# Patient Record
(demographics unavailable — no encounter records)

---

## 2024-11-18 NOTE — HISTORY OF PRESENT ILLNESS
[de-identified] : Subjective: - Summary : Patient presents with a primary complaint of pain in the right knee and hip, which he has been experiencing for about six months and two weeks respectively. The pain increases with activity and limits his mobility significantly, impacting his daily life and activities. - Chief Complaint (CC) : The patient's chief complaints are significant pain in the right knee and hip, limiting his mobility and impacting his daily activities. - History of Present Illness : The patient is a 78/79 year old male who has been regularly active, with a history of doing 10,000 steps a day religiously for 15 years. About six months ago, he started experiencing pain in his right knee which gradually worsened. An x-ray done six months ago confirmed that he has arthritis in his knee. In the past two weeks, he started experiencing intense pain in his hip which affected his walking, causing him to use a cane. The patient has a family history of arthritis from his mother. His pain level often reaches 8 or 9 out of 10, and it becomes severe each time he walks more than 100 feet. The patient has been undergoing physical therapy for the last six months, directed at the knee, which has proved unsatisfactory for him. The patient also went for acupuncture therapy last week, which provided temporary relief only. - Past Medical History : Patient has a history of right knee pain and received a shoulder replacement in the past. He also has a history of leg cramps for which he received physical therapy 10 years ago. He reported that certain exercises successfully manage his leg cramps. - Past Surgical History : The patient underwent a successful shoulder replacement years back. - Family History : - Mother - , diagnosis of terminal hypochondria and arthritis

## 2024-11-18 NOTE — DISCUSSION/SUMMARY
[de-identified] : Assessment and Plan: - Right Hip and Knee ArthritisWith left hip arthritis does not symptomatic : The patient has moderate arthritis in both his hips, with the right hip being of concern here. Additionally, there are signs of arthritis in the knee, particularly on the right side. This is affecting the patient's mobility and quality of life significantly. - Therapeutic Interventions: Initially, conservative management with Tylenol Arthritis once or twice a day. A recommendation also made to apply either ice or heat packs to the affected areas.He is not able to take anti-inflammatories.  He will continue to use his cane which I think is reasonable.  We discussed physical therapy as well and he prefers home exercises but did agree to try therapy and will try to develop a home program as directed more at the hip.  - Patient Education: I have educated the patient about arthritis and given him realistic expectations about its management. Owing to his enduring pain even with physical therapy, I have explained to him the option of replacement surgery.  He is interested in pursuing that to deal with the symptoms but the hip symptoms have been relatively short-lived and I would like to try to treat them to see if we get him to except a baseline before moving to surgical intervention.  We provided prescription for physical therapy and asked him to work on this nail than on operative modalities we discussed.  Will follow-up in 2 to 3 months to gauge his response unless is any significant change at which point he will not hesitate to contact us.  - Follow-Up: Patient is to follow up in 2  months after trying a focused physical therapy for the hip and taking OTC Tylenol Arthritis regularly.

## 2024-11-18 NOTE — CONSULT LETTER
[FreeTextEntry2] : Dr Gamboa [FreeTextEntry1] : I had the privilege of evaluating your patient today. I have enclosed my office note for your reference. If you have any questions, concerns or further input, please do not hesitate to contact me.  Thank you for allowing me to participate in the care of your patient.  Sincerely, Moe Villegas MD

## 2024-11-18 NOTE — PHYSICAL EXAM
[de-identified] : Physical exam he is alert and oriented.  He is ambulating with minimal antalgia.  There is a positive Stinchfield on the right and some pain and stiffness with range of motion compared to the left.  There is also some slight discomfort and crepitus with range of motion of the right knee.  He is neurologically intact in both extremities and feet are warm and well-perfused. [de-identified] : I reviewed previous images of both hips as well as both knees.  There is moderate arthritis of both hips with joint space narrowing sclerosis but no bone-on-bone apposition.  Right knee has more significant arthritis than the left knee particular in the patellofemoral joint where there is significant joint space loss.  The medial compartment is also significantly affected.

## 2024-11-24 NOTE — HISTORY OF PRESENT ILLNESS
[FreeTextEntry8] : Mr. PARAG PRIETO is a 78-year-old male with history of single kidney, prediabetes, HLD, HTN and GERD presenting today for acute visit. He recently saw Dr. Villegas and discussed management of his arthritis.

## 2025-03-24 NOTE — PHYSICAL EXAM
[TextEntry] : PHYSICAL EXAM  General: The patient was alert, oriented and in no distress. Voice was clear.  Face: The patient had no facial asymmetry or mass. The skin was unremarkable.  Ears: External ears were normal without deformity. Ear canals were clear. No cerumen or inflammation Tympanic membranes were intact and normal. No perforation or effusion. mobile  Nose:  The external nose had no significant deformity. . On anterior rhinoscopy, the nasal mucosa was clear.  The anterior septum was grossly midline and deviating to the right. There were no visualized polyps, purulence  or masses.  Clare maneuver was not significantly positive  Oral cavity: Oral mucosa- normal. Oral and base of tongue- clear and without mass. Gingival and buccal mucosa- moist and without lesions. Palate- the palate moved well. There was no cleft palate. There appeared to be good salivary flow.   Oral cavity/oropharynx- no pus, erythema or mass  Neck:  The neck was symmetrical. The parotid and submandibular glands were normal without masses. The trachea was midline and there was no unusual crepitus. Thyroid was smooth and nontender and no masses were palpated. No masses  Lymphatics: Cervical adenopathy- none.     PROCEDURE:  NASAL ENDOSCOPY  Surgeon: Dr. Weeks Indication: Nasal polyps, status post left nasal polypectomy and sinus surgery, assess for infection, inadequate exam on anterior rhinoscopy Anesthetic: Topical lidocaine and Afrin Procedure: The patient was placed in a sitting position.  Following application of the topical anesthetic and decongestant, exam was performed with a flexible telescope.  The scope was passed along the right nasal floor to the nasopharynx.  It was then passed into the region of the middle meatus, middle turbinate, and sphenoethmoid region.  An identical procedure was performed on the left side.  The following findings were noted:  Nasal mucosa: Mild edema.  There is mild dryness anteriorly with little crust Septum:  deviated to the right  Right nasal cavity      Inferior turbinate: Normal      Middle turbinate: normal      Superior turbinate: normal      Inferior meatus: no pus, polyps or congestion      Middle meatus: Postsurgical changes.  The maxillary and ethmoid sinuses were open.  There was no congestion, pus, or polyps.      Superior meatus:  no pus, polyps, or congestion      Sphenoethmoidal recess: no pus, polyps or congestion  Left nasal cavity .      Inferior turbinate: Normal      Middle turbinate: normal      Superior turbinate: normal      Inferior meatus: no pus, polyps or congestion      Middle meatus: Postsurgical changes-the maxillary and ethmoid sinuses were open.  There was polypoid mucosa/small polyp in the anterior ethmoid sinuses/frontal recess.  No purulent drainage      Superior meatus:  no pus, polyps, or congestion      Sphenoethmoidal recess: no pus, polyps or congestion.  Ostium was visualized and was clear  Nasopharynx: no masses, choanae patent, no adenoid tissue

## 2025-03-24 NOTE — ASSESSMENT
[FreeTextEntry1] : He had been doing well since his last visit until January where he developed right-sided nasal congestion intermittently with mild sinus pressure.  There was little crusting on the right side.  The sinuses were clear on that side.  He had a small area of polypoid mucosa/small polyp in the left anterior ethmoid sinuses/frontal recess.  There is no obvious infection on either side.  Plan -Findings and management options were discussed with the patient. - Nasal saline rinses as needed - Moisturizing nasal gel twice daily - Nasal steroid spray as needed - he is going to take a short burst of prednisone.  He has taken it in the past.  I reviewed some of the associated risks including the risk of mood changes, GI complications and bone issues/fractures. They should take the medication with food, take pepcid, avoid drinking alcohol and avoid strenuous exercise. - I will hold off on antibiotics since there was no obvious infection - I will speak with him about repeating his hearing test when he returns - Follow-up in approximately 3 weeks - Call and return earlier if any problems or worsening symptoms

## 2025-03-24 NOTE — HISTORY OF PRESENT ILLNESS
[de-identified] : PARAG PRIETO is a 79 year old patient With a history of chronic rhinosinusitis, nasal polyps, sinus surgery, and nasal valve remodeling.  He had been doing well with significant improvement in the nasal obstruction.  In January while in [place], he started having mild right-sided congestion with a mild pressure sensation superiorly in the nose.  He has no nasal drainage.  He has no sinus pain.  He has been using a nasal steroid spray.     ENT history  He has a history of allergies. He had immunotherapy in the past He has constant sinus symptoms He has reflux and has been on medication  He had endoscopic sinus surgery in 2018 for nasal polyps bilateral inferior turbinate submucous resection and left nasal polypectomy 5/30/23. Pathology was benign and showed benign sinonasal nasal mucosa with mild chronic inflammation.  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 Per the note, there is evidence of prior sinus surgery. There was mild mucosal thickening involving the maxillary sinuses with a small mucous retention cyst versus polyp along the medial wall of the right maxillary sinus, mild mucosal thickening of the ethmoid air cells, polypoid soft tissue thickening at the bilateral sphenoid ethmoidal recesses, the sphenoid sinuses were clear and frontal sinuses were clear

## 2025-03-28 NOTE — HISTORY OF PRESENT ILLNESS
[FreeTextEntry1] : The patient presents for follow up visit in regard to _______.  The patient was last seen on September 4th, 2024, in regard to right index and thumb trigger finger which were treated with a corticosteroid injection into the flexor tendon sheath and FPL tendon sheath.

## 2025-04-07 NOTE — ASSESSMENT
[FreeTextEntry1] : Mr. Lyle is a 79-year-old man with an umbilical ventral hernia and a rectus diastasis.  We discussed the options for management, including watchful waiting, an open ventral hernia repair with mesh, a robotic-assisted ventral hernia repair with mesh, and an open ventral hernia repair in conjunction with plastic surgery to repair the rectus diastasis.  We discussed the risks, benefits, and alternatives to surgery, including but not limited to bleeding, infection, mesh complications, hernia recurrence, and damage to the surrounding structures.  The patient is amenable to proceeding with surgery.  We will plan for a robotic-assisted ventral hernia repair with mesh on April 24, 2025.  Pre-operative labs, ECG, and chest X-ray were ordered.

## 2025-04-07 NOTE — HISTORY OF PRESENT ILLNESS
[de-identified] : Mr. Lyle presented previously for evaluation and management of an umbilical hernia.  He stated the hernia has been present for 3-4 years.  He first noticed the hernia as a bulge when his "innie became an outie".  He denied significant pain of the area.  He stated the hernia is not enlarging, although he noted it will protrude more after he eats "a lot". [de-identified] : He presented today for follow up of the umbilical hernia.  He stated the hernia has enlarged somewhat since his prior visit.  He noted "gurgling" inside the hernia after eating.  He denied significant pain in the area.

## 2025-04-07 NOTE — HISTORY OF PRESENT ILLNESS
[de-identified] : Mr. Lyle presented previously for evaluation and management of an umbilical hernia.  He stated the hernia has been present for 3-4 years.  He first noticed the hernia as a bulge when his "innie became an outie".  He denied significant pain of the area.  He stated the hernia is not enlarging, although he noted it will protrude more after he eats "a lot". [de-identified] : He presented today for follow up of the umbilical hernia.  He stated the hernia has enlarged somewhat since his prior visit.  He noted "gurgling" inside the hernia after eating.  He denied significant pain in the area.

## 2025-04-07 NOTE — PHYSICAL EXAM
[Calm] : calm [de-identified] : NCAT, no scleral icterus  [de-identified] : NAD, comfortable  [de-identified] : +BS soft NT ND.  No hepatosplenomegaly.  Moderate rectus diastasis above the umbilicus.  Small umbilical hernia, reducible and non-tender. [de-identified] : No clubbing, cyanosis, or edema.  [de-identified] : Warm, dry.  [de-identified] : A&Ox3

## 2025-04-07 NOTE — PHYSICAL EXAM
[Calm] : calm [de-identified] : NAD, comfortable  [de-identified] : NCAT, no scleral icterus  [de-identified] : +BS soft NT ND.  No hepatosplenomegaly.  Moderate rectus diastasis above the umbilicus.  Small umbilical hernia, reducible and non-tender. [de-identified] : No clubbing, cyanosis, or edema.  [de-identified] : Warm, dry.  [de-identified] : A&Ox3

## 2025-04-07 NOTE — ASSESSMENT
Detail Level: Detailed [FreeTextEntry1] : Mr. Lyle is a 79-year-old man with an umbilical ventral hernia and a rectus diastasis.  We discussed the options for management, including watchful waiting, an open ventral hernia repair with mesh, a robotic-assisted ventral hernia repair with mesh, and an open ventral hernia repair in conjunction with plastic surgery to repair the rectus diastasis.  We discussed the risks, benefits, and alternatives to surgery, including but not limited to bleeding, infection, mesh complications, hernia recurrence, and damage to the surrounding structures.  The patient is amenable to proceeding with surgery.  We will plan for a robotic-assisted ventral hernia repair with mesh on April 24, 2025.  Pre-operative labs, ECG, and chest X-ray were ordered. Quality 226: Preventive Care And Screening: Tobacco Use: Screening And Cessation Intervention: Patient screened for tobacco use and is an ex/non-smoker

## 2025-05-05 NOTE — REASON FOR VISIT
[Post Op: _________] : a [unfilled] post op visit [FreeTextEntry1] : He underwent a robotic-assisted ventral hernia repair with mesh on April 24, 2025. [FreeTextEntry2] : 4/24/2025

## 2025-05-05 NOTE — PHYSICAL EXAM
[Calm] : calm [de-identified] : NAD, comfortable  [de-identified] : NCAT, no scleral icterus  [de-identified] : soft NT ND.  Incisions healing well without erythema or induration.  No evidence of a recurrent umbilical hernia or any incisional hernias on Valsalva maneuver.  Steri-Strips were removed.  [de-identified] : No clubbing, cyanosis, or edema.  [de-identified] : Warm, dry.  [de-identified] : A&Ox3

## 2025-05-05 NOTE — ASSESSMENT
[FreeTextEntry1] : Mr. Lyle is a 79-year-old man who underwent a robotic-assisted ventral hernia repair with mesh on April 24, 2025.  He is recovering as expected and will follow up with me as needed.

## 2025-05-05 NOTE — HISTORY OF PRESENT ILLNESS
[de-identified] : Mr. Lyle presented previously for evaluation and management of an umbilical hernia.  He stated the hernia has been present for 3-4 years.  He first noticed the hernia as a bulge when his "innie became an outie".  He denied significant pain of the area.  He stated the hernia is not enlarging, although he noted it will protrude more after he eats "a lot".  He underwent a robotic-assisted ventral hernia repair with mesh on April 24, 2025. [de-identified] : He presented today for a routine post-operative visit.  He is overall feeling well.  He denied fever, chills, nausea, vomiting, diarrhea, or constipation.

## 2025-07-16 NOTE — PHYSICAL EXAM
[de-identified] : Physical exam demonstrates the patient to be alert and oriented x 3 and capable of ambulation. The patient is well-developed and well-nourished in no apparent respiratory distress. The majority of the skin is intact bilaterally in the upper extremities without any bilateral elbow lymphadenopathy.  Full, symmetric digital range of motion.  Tender to palpation over the left ringA1 pulley, locking can be appreciated.  Nontender over the MCP joint/collateral ligament.  No collateral ligament instability or extensor tendon subluxation.  Flexor and extensor tendons intact.  Sensation is intact to light touch throughout both hands, however, there is some subjective numbness at the right thumb through long finger.  Positive Tinel's sign over the right carpal tunnel, directly over the healed palmar incision. There is 5/5 strength right APB.  All fingers are well-perfused.

## 2025-07-16 NOTE — PROCEDURE
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 We discussed treatment options and she elected to undergo a trigger finger injection. The risks, benefits, and alternatives were discussed with the patient. This included, but was not limited to nerve injury, infection, subcutaneous atrophy, skin depigmentation etc. Under informed consent and sterile conditions the flexor tendon sheath at the A1 pulley was injected with a combination of 1/2 cc Kenalog-10 and 1/2 cc of 2% plain lidocaine. It is my hopes that this significantly alleviates the patients symptoms.   We discussed treatment options and shared decision-making was undertaken. I recommended that the patient undergo a diagnostic and therapeutic carpal tunnel injection. The risks benefits and alternatives were discussed with the patient including, but not limited to, subcutaneous atrophy, skin depigmentation, nerve injury, infection, worsening symptoms, pain etc. The patient agreed and understood informed consent and sterile conditions the right carpal tunnel was injected with 1 cc of Kenalog 10. A sterile Band-Aid was placed.

## 2025-07-16 NOTE — HISTORY OF PRESENT ILLNESS
[FreeTextEntry1] : The patient is presenting for a follow-up visit in regard to a new issue of left ring finger pain, locking and catching.  He also reports worsening right thumb through long finger numbness and tingling.  He reports having a right mini open carpal tunnel release roughly 3 years ago which did not fully relieve his symptoms and he feels like the more recently worsened.  The patient underwent a corticosteroid injection for his right trigger thumb, right index and right long finger trigger fingers on September 4, 2024, with relief of symptoms.

## 2025-07-16 NOTE — ASSESSMENT
[FreeTextEntry1] : My impression is that the patient has a left ring finger trigger finger. Initial treatment options were discussed shared decision-making was made to proceed with a corticosteroid injection into the flexor tendon sheath today. I explained that recurrence of symptoms is not uncommon. If this were to occur, consideration for another injection will be made. I did note that multiple, repeated injections become less efficacious over time and eventually, surgery should be considered. All questions were answered. The patient was well in accordance with the plan.   My impression is that the patient has possibly recurrent right carpal tunnel syndrome status post mini open release years ago. I discussed the nature of the condition as well as both nonoperative and operative treatment options. I explained that nonoperative management entails carpal tunnel night splint wear and/or corticosteroid injection into the carpal tunnel. I explained that both options do not definitively address the peripheral nerve compression and are only aimed to help alleviate symptoms.  I did mention that the condition can progress with time and lead to sensory loss and/or irreversible muscle weakness.  I discussed surgery, which entails decompression of the carpal tunnel by incising the transverse carpal ligament.  I explained that this minimizes/prevent the progression of continued nerve compression and worsening sensory loss/weakness.  I did note that it involves potential risks and complications.  Shared decision making was made today and the patient elected to proceed with a diagnostic and therapeutic right carpal tunnel injection for symptom relief.  The patient will plan to follow back up with me as needed.

## 2025-07-16 NOTE — PHYSICAL EXAM
[de-identified] : Physical exam demonstrates the patient to be alert and oriented x 3 and capable of ambulation. The patient is well-developed and well-nourished in no apparent respiratory distress. The majority of the skin is intact bilaterally in the upper extremities without any bilateral elbow lymphadenopathy.  Full, symmetric digital range of motion.  Tender to palpation over the left ringA1 pulley, locking can be appreciated.  Nontender over the MCP joint/collateral ligament.  No collateral ligament instability or extensor tendon subluxation.  Flexor and extensor tendons intact.  Sensation is intact to light touch throughout both hands, however, there is some subjective numbness at the right thumb through long finger.  Positive Tinel's sign over the right carpal tunnel, directly over the healed palmar incision. There is 5/5 strength right APB.  All fingers are well-perfused.